# Patient Record
Sex: FEMALE | URBAN - METROPOLITAN AREA
[De-identification: names, ages, dates, MRNs, and addresses within clinical notes are randomized per-mention and may not be internally consistent; named-entity substitution may affect disease eponyms.]

---
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
## 2021-03-22 RX ORDER — NORGESTIMATE AND ETHINYL ESTRADIOL 0.25-0.035
KIT ORAL
Qty: 84 TABLET | Refills: 0 | Status: SHIPPED | OUTPATIENT
Start: 2021-03-22 | End: 2021-04-22

## 2021-03-22 NOTE — TELEPHONE ENCOUNTER
Pt is scheduled  Future Appointments   Date Time Provider Sancho Robert   4/19/2021  3:00 PM Roland Rascon MD [facility] [address]     Please refill Meds

## 2021-03-22 NOTE — TELEPHONE ENCOUNTER
Last OV: 3/6/20 with Dr. Jack Blakely for gyn problem  Last refill date: 4/17/20  Follow-up: 1 year  Next appt.: none scheduled    Patient due for annual. Please contact her to schedule appt and then return to RN pool for refill.  Thank you

## 2021-04-21 ENCOUNTER — TELEPHONE (OUTPATIENT)
Dept: OBGYN CLINIC | Facility: CLINIC | Age: 21
End: 2021-04-21

## 2021-04-21 NOTE — TELEPHONE ENCOUNTER
patient will bring in negative result sister had positive week ago and she tested right away, she knows to bring proof .  Please advise    Thank you

## 2021-04-21 NOTE — TELEPHONE ENCOUNTER
Exposure on 4/10; tested on 4/12 at [facility],  not rapid test. Negative. Pt has not had any symptoms. Pt advised OK to keep appt; will be 12 days past exposure. Pt will bring a copy of negative test at appt.

## 2021-04-22 ENCOUNTER — OFFICE VISIT (OUTPATIENT)
Dept: OBGYN CLINIC | Facility: CLINIC | Age: 21
End: 2021-04-22
Payer: COMMERCIAL

## 2021-04-22 VITALS
SYSTOLIC BLOOD PRESSURE: 130 MMHG | HEART RATE: 99 BPM | WEIGHT: 143 LBS | HEIGHT: 61.75 IN | BODY MASS INDEX: 26.31 KG/M2 | DIASTOLIC BLOOD PRESSURE: 70 MMHG

## 2021-04-22 DIAGNOSIS — Z01.419 WELL WOMAN EXAM WITH ROUTINE GYNECOLOGICAL EXAM: Primary | ICD-10-CM

## 2021-04-22 DIAGNOSIS — Z30.41 SURVEILLANCE FOR BIRTH CONTROL, ORAL CONTRACEPTIVES: ICD-10-CM

## 2021-04-22 PROCEDURE — 3008F BODY MASS INDEX DOCD: CPT | Performed by: OBSTETRICS & GYNECOLOGY

## 2021-04-22 PROCEDURE — 3078F DIAST BP <80 MM HG: CPT | Performed by: OBSTETRICS & GYNECOLOGY

## 2021-04-22 PROCEDURE — 99395 PREV VISIT EST AGE 18-39: CPT | Performed by: OBSTETRICS & GYNECOLOGY

## 2021-04-22 PROCEDURE — 87491 CHLMYD TRACH DNA AMP PROBE: CPT | Performed by: OBSTETRICS & GYNECOLOGY

## 2021-04-22 PROCEDURE — 87591 N.GONORRHOEAE DNA AMP PROB: CPT | Performed by: OBSTETRICS & GYNECOLOGY

## 2021-04-22 PROCEDURE — 3075F SYST BP GE 130 - 139MM HG: CPT | Performed by: OBSTETRICS & GYNECOLOGY

## 2021-04-22 RX ORDER — NORGESTIMATE AND ETHINYL ESTRADIOL 0.25-0.035
1 KIT ORAL DAILY
Qty: 84 TABLET | Refills: 4 | Status: SHIPPED | OUTPATIENT
Start: 2021-04-22

## 2021-04-22 NOTE — PROGRESS NOTES
OB/GYN H&P       CC: Patient presents with:  Physical      HPI: Yung Son is a 22year old  here for WWE. Doing well on OCP  Some hair growth on the chest, not coarse. Some on the chin. She plucks them.      GYN hx:   Menarche: 11  Period Cycl No mass or tenderness. Left: No mass or tenderness. Musculoskeletal:      Cervical back: Neck supple. Lymphadenopathy:      Upper Body:      Right upper body: No axillary adenopathy. Left upper body: No axillary adenopathy.    Skin:

## 2021-05-05 ENCOUNTER — OFFICE VISIT (OUTPATIENT)
Dept: FAMILY MEDICINE CLINIC | Facility: CLINIC | Age: 21
End: 2021-05-05
Payer: COMMERCIAL

## 2021-05-05 VITALS
OXYGEN SATURATION: 98 % | SYSTOLIC BLOOD PRESSURE: 120 MMHG | WEIGHT: 142.81 LBS | HEART RATE: 88 BPM | TEMPERATURE: 99 F | RESPIRATION RATE: 16 BRPM | DIASTOLIC BLOOD PRESSURE: 60 MMHG | BODY MASS INDEX: 26.28 KG/M2 | HEIGHT: 62 IN

## 2021-05-05 DIAGNOSIS — J00 ACUTE RHINITIS: ICD-10-CM

## 2021-05-05 DIAGNOSIS — H81.12 BPPV (BENIGN PAROXYSMAL POSITIONAL VERTIGO), LEFT: ICD-10-CM

## 2021-05-05 DIAGNOSIS — H69.83 DYSFUNCTION OF BOTH EUSTACHIAN TUBES: ICD-10-CM

## 2021-05-05 DIAGNOSIS — Z11.52 ENCOUNTER FOR SCREENING FOR COVID-19: Primary | ICD-10-CM

## 2021-05-05 PROCEDURE — 3074F SYST BP LT 130 MM HG: CPT | Performed by: NURSE PRACTITIONER

## 2021-05-05 PROCEDURE — 3078F DIAST BP <80 MM HG: CPT | Performed by: NURSE PRACTITIONER

## 2021-05-05 PROCEDURE — 99214 OFFICE O/P EST MOD 30 MIN: CPT | Performed by: NURSE PRACTITIONER

## 2021-05-05 PROCEDURE — 3008F BODY MASS INDEX DOCD: CPT | Performed by: NURSE PRACTITIONER

## 2021-05-05 RX ORDER — FLUTICASONE PROPIONATE 50 MCG
2 SPRAY, SUSPENSION (ML) NASAL DAILY
Qty: 1 BOTTLE | Refills: 0 | Status: SHIPPED | OUTPATIENT
Start: 2021-05-05

## 2021-05-05 RX ORDER — MECLIZINE HYDROCHLORIDE 25 MG/1
25 TABLET ORAL 3 TIMES DAILY PRN
Qty: 10 TABLET | Refills: 0 | Status: SHIPPED | OUTPATIENT
Start: 2021-05-05

## 2021-05-05 NOTE — PROGRESS NOTES
CHIEF COMPLAINT:     Patient presents with:  Dizziness  :      HPI:     Joselin Wong is a 22year old female presents with complaints of dizziness. Patient has had symptoms starting this morning. Patient has not symptoms like this before.   Symptoms syncope. no falling. Denies fever, chills,weight change, decreased appetite. No fever. SKIN: Denies rashes, skin wounds or ulcers.   EYES: Denies blurred vision or double vision  HENT: Denies facial weakness, congestion, rhinorrhea, sore throat, no dim paroxysmal positional vertigo), left  Dysfunction of both eustachian tubes  Acute rhinitis    PLAN:   COVID test sent. Advised to stay home pending results.    Meclizine as below for dizziness  Flonase as below for rhinitis and eustachian tube dysfunction of vertigo. An episode of vertigo may last seconds, minutes, or hours. Once you are over the first episode of vertigo, it may never return. Sometimes symptoms return off and on for several weeks or longer. BPPV is treatable.  The Epley maneuver is a simpl prescribed medicine  · Repeated vomiting even after taking prescribed medicine  · Weakness that gets worse  · Trouble hearing  · Fever of 100.4ºF (38ºC) or higher, or as directed by your healthcare provider  Call 911  Call 911 right away if any of these oc away sooner with treatment. You may need vestibular rehabilitation if you have balance problems that don't go away. Meniere’s disease  This condition is uncommon. It happens when there is too much fluid in the ear canals.  This causes increased pressure healthcare provider. · Avoid being exposed to cigarette smoke (yours or others’).   · You may use acetaminophen or ibuprofen to control pain and fever, unless another medicine was prescribed. If you have chronic liver or kidney disease, have ever had a sto medical care. Always follow your healthcare professional's instructions. Coronavirus Disease 2019 (COVID-19)     [facility] is committed to the safety and well-being of our patients, members, employees, and communities.  As concerns arise a date of last exposure  • After 10 days without testing from date of last exposure  • After day 7 from date of last exposure with a negative test result (test must occur on day 5 or later)  After stopping quarantine, you should  • Watch for symptoms until 1 sprays or wipes according to the label instructions.          Seek Further Care     If you are awaiting test results or are confirmed positive for COVID -19, and your symptoms worsen at home with symptoms such as: extreme weakness, difficult breathing, or u primary care provider or [facility]. Post-Discharge Follow-up  If you are diagnosed with COVID, refrain from exercise until approved by your primary care provider.  Please call your primary care provider within 2 days of your discharge to [email or web address]  Centers for Disease Control & Prevention (CDC)  10 things you can do to manage your health at home, [email or web address]. pdf  ht 17, 2021, from [employer].  What it means to be A Coronavirus \"long-hauler\". (2021, January 28). Retrieved March 17, 2021, from [email or web address]-it-means-xt-cv-[email or web address]-[email or web address]-long

## 2021-05-05 NOTE — PATIENT INSTRUCTIONS
Follow up with your doctor if not improving over the next 3 days. Go to the ER for any worsening symptoms such as worsening dizziness, headache, chest pain, palpitations, or blurred vision.        Benign Paroxysmal Positional Vertigo    Benign paroxysmal someone help you when you get up. Get rid of home hazards such as loose electrical cords and throw rugs. Don’t walk in unfamiliar areas that aren't lighted. Use night lights in bathrooms and kitchen areas.   · Don't drive or work with dangerous machinery fo (Vertigo)        Benign positional vertigo (BPV)   This is the most common cause of vertigo. BPV is also called benign positional paroxysmal vertigo (BPPV). It happens when crystals in the ear canals shift into the wrong place.  Vertigo usually occurs when problems, such as a stroke or bleeding in the brain    [place] last reviewed this educational content on 2/1/2020  © 4154-8899 [employer]. All rights reserved. This information is not intended as a substitute for professional medical care. Over-the-counter cold medicines will not shorten the length of time you’re sick, but they may be helpful for the following symptoms: cough, sore throat, and nasal and sinus congestion.  If you take prescription medicines, ask your healthcare provider or Snehal Pos[place] from the time of exposure and follow the below recommendations. If you test positive for COVID-19, you should notify your family and friends with whom you have had close contact recently (starting 2 days before you first had symptoms).      What counts as from work, school, and away from other public places. If you must go out, avoid using any kind of public transportation, ridesharing, or taxis. 2. Monitor your symptoms carefully. If your symptoms get worse, call your healthcare provider immediately.   3. should remain under home isolation precautions following the below guidelines:  • At least 24 hours have passed since recovery defined as resolution of fever without the use of fever-reducing medications; and  · Improvement in respiratory symptoms (e.g., c most severely affected by the virus. How can I donate convalescent plasma? The process for donating plasma is very similar to donating blood.  [employer] (a large blood research institute in [address] and one of [employer] blood product supplier severe fatigue Brain fog or trouble concentrating   Headaches Sleeping difficulties   Anxiety or depression Loss of taste or smell   Chest pain  Palpitations Light headedness  Muscle Pain   Increased Heart Rate Tingling, numbness, or burning sensation   Sh

## 2021-06-09 DIAGNOSIS — J00 ACUTE RHINITIS: ICD-10-CM

## 2021-06-09 RX ORDER — FLUTICASONE PROPIONATE 50 MCG
SPRAY, SUSPENSION (ML) NASAL
Qty: 16 G | Refills: 0 | OUTPATIENT
Start: 2021-06-09